# Patient Record
Sex: FEMALE | Race: WHITE | URBAN - METROPOLITAN AREA
[De-identification: names, ages, dates, MRNs, and addresses within clinical notes are randomized per-mention and may not be internally consistent; named-entity substitution may affect disease eponyms.]

---

## 2022-07-16 ENCOUNTER — OFFICE VISIT (OUTPATIENT)
Dept: URBAN - METROPOLITAN AREA CLINIC 30 | Facility: CLINIC | Age: 52
End: 2022-07-16
Payer: COMMERCIAL

## 2022-07-16 DIAGNOSIS — H43.393 OTHER VITREOUS OPACITIES, BILATERAL: ICD-10-CM

## 2022-07-16 DIAGNOSIS — E11.9 DIABETES MELLITUS TYPE 2 WITHOUT MENTION OF COMPLICATION: Primary | ICD-10-CM

## 2022-07-16 DIAGNOSIS — H52.4 PRESBYOPIA: ICD-10-CM

## 2022-07-16 DIAGNOSIS — Z79.84 LONG TERM USE OF ORAL ANTIDIABETIC DRUG: ICD-10-CM

## 2022-07-16 PROCEDURE — 92004 COMPRE OPH EXAM NEW PT 1/>: CPT | Performed by: OPTOMETRIST

## 2022-07-16 PROCEDURE — 92134 CPTRZ OPH DX IMG PST SGM RTA: CPT | Performed by: OPTOMETRIST

## 2022-07-16 ASSESSMENT — KERATOMETRY
OS: 43.70
OD: 43.66

## 2022-07-16 ASSESSMENT — VISUAL ACUITY
OD: 20/20
OS: 20/20

## 2022-07-16 ASSESSMENT — INTRAOCULAR PRESSURE
OD: 18
OS: 19

## 2022-07-16 NOTE — IMPRESSION/PLAN
Impression: Diabetes mellitus Type 2 without mention of complication: P98.6.
+long-time steroid use d/t severe asthma  Plan: Diabetes type II: no background retinopathy, no signs of neovascularization noted. Discussed ocular and systemic benefits of blood sugar control. Most recent A1C 6.0, per pt. BG runs between 200-low 300's, per pt.

## 2022-07-16 NOTE — IMPRESSION/PLAN
Impression: Other vitreous opacities, bilateral: H43.393. Plan: Pt educ on findings. Retinas flat and attached OU. Reviewed signs and symptoms of RD and to call asap if occurs. See MAC OCT- WNL.

## 2022-07-16 NOTE — IMPRESSION/PLAN
Impression: Presbyopia: H52.4. Plan: Generated new [address] Pt ed on BG fluctuations affecting VA.